# Patient Record
(demographics unavailable — no encounter records)

---

## 2025-05-14 NOTE — HISTORY OF PRESENT ILLNESS
[FreeTextEntry1] : This is a 68y.o. F with PMHx of DM, HTN, HLD, menopause, sleep apnea here today to establish care. Patient states that her insurance changed so needed to find a new PCP. Prior PCP Dr. Rigoberto Cam. Patient reports that she has been experiencing upper back pain described as tightness that stretches from the right side to the left side, patient states pain comes and goes. States it has been occurring for the last week and a half. Patient has significant family history of HTN, HLD, and DM.   Denies chest pain, palpitations, diaphoresis, vision changes, HA, dizziness, syncope, cough, wheezing, edema, fever, chills, infection.

## 2025-05-14 NOTE — PHYSICAL EXAM
